# Patient Record
Sex: FEMALE | Race: WHITE | NOT HISPANIC OR LATINO
[De-identification: names, ages, dates, MRNs, and addresses within clinical notes are randomized per-mention and may not be internally consistent; named-entity substitution may affect disease eponyms.]

---

## 2021-01-15 ENCOUNTER — APPOINTMENT (OUTPATIENT)
Dept: ENDOCRINOLOGY | Facility: CLINIC | Age: 36
End: 2021-01-15

## 2024-08-02 ENCOUNTER — NON-APPOINTMENT (OUTPATIENT)
Age: 39
End: 2024-08-02

## 2024-08-05 ENCOUNTER — NON-APPOINTMENT (OUTPATIENT)
Age: 39
End: 2024-08-05

## 2024-08-06 ENCOUNTER — APPOINTMENT (OUTPATIENT)
Dept: OTOLARYNGOLOGY | Facility: CLINIC | Age: 39
End: 2024-08-06

## 2024-08-06 PROBLEM — R09.A2 GLOBUS SENSATION: Status: ACTIVE | Noted: 2024-08-06

## 2024-08-06 PROCEDURE — 99205 OFFICE O/P NEW HI 60 MIN: CPT | Mod: 25

## 2024-08-06 PROCEDURE — 31575 DIAGNOSTIC LARYNGOSCOPY: CPT

## 2024-08-06 NOTE — PHYSICAL EXAM
[TextEntry] : Focused Head and Neck Examination:  General Appearance: The patient has a normal appearance (head and face), able to communicate with normal speech and is a well-groomed, well-developed, well-nourished, thin female in no apparent distress.  Breathing was silent and not labored. The patient was alert had normal affect and oriented to person, place, and time. The patient had normal facial and neck skin with normal facial symmetry, strength and motion, no visible/ palpable facial masses or sinus tenderness.  Otologic Exam: The pinnae and bilateral external ear canals were without lesions and clear.  The tympanic membranes were normal bilaterally without perforation and demonstrated normal mobility. There was no evidence of middle ear effusion or infection. Hearing is grossly normal to finger rub.   External Nasal Exam: The external nose was normal in appearance without lesions or deformity.    Intranasal Exam: There were no mucosal lesions, discolorations, nasal polyps, or masses. The nasal septum was intact without perforations. The nasal septum was deviated slightly to the right. The inferior turbinates were normal. The middle turbinates were normal bilaterally.  The middle meatus was clear, and the secretions were normal.  Nasopharynx: There were no visible masses, mucosal ulcerations, or other lesions.  Secretions were normal.   Oral cavity: The patient's lips and vermillion border were normal without lesions, ulcerations or discolorations. Dentition was normal but with evidence of bruxism, the gums were normal, the oral mucosa was normal and there were no lesions, discolorations, ulcerations, erythema, or leukoplakia.  The floor of mouth was without lesions or palpable calculi. The oral tongue has normal mobility, without palpable or visible lesions, ulcerations, nodularity or tenderness.  All surfaces including lateral, ventral and dorsal mucosa examined and palpated. Expressed salivary flow was normal.  Stensens ducts are without palpable calculi.    Oropharynx: The tongue base was without palpable lesions, the soft palate was normal without lesions, the hard palate was normal without lesions, ulcerations, nodularity or discolorations.  The palatine tonsils were present and normal, and the lingual tonsils were minimally hyperplastic.  Pharynx: The lateral and posterior pharyngeal walls were intact without mucosal lesions, discolorations, ulcerations, or masses.    Larynx and Hypopharynx: Flexible fiber optic laryngoscopy was performed with topical anesthesia using 4% lidocaine and 0.5 % Oxymetazoline on cotton pledgets. More details of the exam are outlined in my procedure note but this examination revealed normal, symmetric vocal fold mobility and normal mucosa without vocal fold lesions, discolorations, or ulcerations.  There was mild posterior pachydermia suggestive of reflux laryngitis.  The epiglottis and false vocal folds were normal without mucosal lesions. The piriform sinuses opened well and there were no lesions or pooling of saliva. The trachea was clear without narrowing in the immediate subglottic area and in midline position without lesions.    Neck Exam: There was no palpable lymphadenopathy or bruits in the central or lateral cecy drainage basins levels I-VI.  There was no asymmetry, pulsatile masses or nodules. The parotid and submandibular glands were not enlarged or tender and without palpable nodules or mass.  The temporomandibular joints were normal bilaterally without deviation/subluxation/click/tenderness or crepitus to palpation.    Thyroid Examination: The thyroidectomy surgical bed is without palpable nodules or mass.  The incision is healed well.  Neurological Exam: Cranial nerves II through XII were intact.  There was no visible tremor. Gross motor and sensory functions are normal.   Ocular Exam: Pupils were equal and responsive to light.  Extraocular movements and gaze were normal. The sclera and conjunctiva were normal and anicteric.  Nystagmus was absent. No sign of erythema, ulcerations or lesions.    Respiratory: Respiratory effort is normal with symmetric chest expansion and no retractions or use of accessory muscles.    Cardiovascular: Extremities are normal with strong pulses, normal temperature, and no edema.

## 2024-08-06 NOTE — CONSULT LETTER
[Dear  ___] : Dear  [unfilled], [Consult Letter:] : I had the pleasure of evaluating your patient, [unfilled]. [Please see my note below.] : Please see my note below. [Sincerely,] : Sincerely, [DrClaudette  ___] : Dr. DUNLAP [FreeTextEntry3] :  Eleuterio Lebron M.D., FACS, [facility] Director [facility] Certified in Thyroid/Parathyroid/Neck Ultrasound, NABILA/ ADELA , Department of Otolaryngology [facility] [employer] [facility] at [facility]

## 2024-08-06 NOTE — HISTORY OF PRESENT ILLNESS
[de-identified] : Jada is a generally healthy 38-year-old female  systems who came for f/u after total thyroidectomy and difficulty managing thyroid hormone replacement associated with infertility x 6 years.  She had a total thyroidectomy in 2015 for a suspicion of malignancy on genetic molecular testing.  However, her final surgical pathology was consistent with Hashimoto's thyroiditis.   She had known Hashimoto's thyroiditis and had already been on thyroid hormone replacement prior to her surgery.  She had been on a relatively stable dose of levothyroxine post op but then voluntarily lost 25 pounds for health reasons and became hyperthyroid.  Her last set of thyroid function studies early July of this year demonstrated a TSH of 0.08, a free T3 of 1.29 (lower limit of normal at 2.18), a free T4 of 0.94 (lower range of normal).  She is currently taking Synthroid brand 150 mcg on Monday, Wednesday, Friday, and Sunday and on Tuesday Thursday, Saturday, 175 mcg.  Therefore, her effective dose of LT4 is ~ 260 mcgs daily.  She complains of chronic throat tightening and increased throat clearing, PND, with clear mucus and denies GERD/pyrosis/regurgitation. She is considering another trial of IVF but wants to have her thyroid hormone better managed.  She actually has an appointment with Taylor Rao at the end of the month. She denies fever, body aches, cough, cyanosis, chest burning or anosmia.  She had 2 COVID infections this year.  All family members at home are well.  She is vaccinated and boosted.

## 2024-08-06 NOTE — PROCEDURE
[Unable to Cooperate with Mirror] : patient unable to cooperate with mirror [Gag Reflex] : gag reflex preventing mirror examination [Globus] : globus [Topical Lidocaine] : topical lidocaine [Oxymetazoline HCl] : oxymetazoline HCl [Flexible Endoscope] : examined with the flexible endoscope [Serial Number: ___] : Serial Number: [unfilled] [de-identified] : Verbal informed consent was obtained from the patient.  Findings: The nasal septum is minimally deviated to the right. There are no masses or polyps, and the nasal mucosa and secretions are normal. The choanae and posterior nasopharynx are normal without masses or drainage. The Eustachian tube orifices appear patent. The pharynx, including the posterior and lateral pharyngeal walls, the vallecula and base of tongue are normal without ulcerations, lesions or masses. The hypopharynx including the pyriform sinuses open well without pooling of secretions, mucosal lesions or masses. The supraglottic larynx including the epiglottis, petiole, arytenoids, glossoepiglottic, aryepiglottic and pharyngoepiglottic folds are normal without mucosal lesions, ulcerations or masses. The glottis reveals normal false vocal folds. The true vocal folds are glistening white, tense and of equal length, without paralysis, having symmetric mobility on adduction and abduction. There are no mucosal lesions, nodules, cysts, erythroplasia or leukoplakia. The posterior cricoid area has healthy pink mucosa in the interarytenoid area and esophageal inlet. There is mild thickening/pachydermia of the interarytenoid mucosa suggestive of posterior laryngitis from laryngopharyngeal acid reflux disease. The trachea is clear without narrowing in the immediate subglottic region, without deviation or lesions. ------------------------------------------------------------------------------------------- [de-identified] : status post total thyroidectomy, throat tightness and throat clearing, postnasal drip

## 2024-08-21 ENCOUNTER — APPOINTMENT (OUTPATIENT)
Dept: ENDOCRINOLOGY | Facility: CLINIC | Age: 39
End: 2024-08-21
Payer: COMMERCIAL

## 2024-08-21 VITALS
TEMPERATURE: 98.5 F | DIASTOLIC BLOOD PRESSURE: 59 MMHG | HEART RATE: 67 BPM | BODY MASS INDEX: 22.36 KG/M2 | SYSTOLIC BLOOD PRESSURE: 93 MMHG | WEIGHT: 151 LBS | HEIGHT: 69 IN | OXYGEN SATURATION: 98 %

## 2024-08-21 DIAGNOSIS — N97.9 FEMALE INFERTILITY, UNSPECIFIED: ICD-10-CM

## 2024-08-21 DIAGNOSIS — E04.9 HYPOTHYROIDISM, UNSPECIFIED: ICD-10-CM

## 2024-08-21 DIAGNOSIS — E03.9 HYPOTHYROIDISM, UNSPECIFIED: ICD-10-CM

## 2024-08-21 PROCEDURE — 99204 OFFICE O/P NEW MOD 45 MIN: CPT

## 2024-08-21 NOTE — PHYSICAL EXAM
[Alert] : alert [Well Nourished] : well nourished [No Acute Distress] : no acute distress [Normal Sclera/Conjunctiva] : normal sclera/conjunctiva [PERRL] : pupils equal, round and reactive to light [Normal Outer Ear/Nose] : the ears and nose were normal in appearance [Normal TMs] : both tympanic membranes were normal [Supple] : the neck was supple [Well Healed Scar] : well healed scar [No Respiratory Distress] : no respiratory distress [Clear to Auscultation] : lungs were clear to auscultation bilaterally [Normal S1, S2] : normal S1 and S2 [Normal Rate] : heart rate was normal [Regular Rhythm] : with a regular rhythm [Normal Bowel Sounds] : normal bowel sounds [Soft] : abdomen soft [Normal Gait] : normal gait [No Joint Swelling] : no joint swelling seen [No Rash] : no rash [Oriented x3] : oriented to person, place, and time [Normal Insight/Judgement] : insight and judgment were intact

## 2024-08-21 NOTE — ASSESSMENT
[FreeTextEntry1] : 38 year old female here for evaluation of Hashimoto's thyroiditis now s/p total thyroidectomy.   Hashimoto's disease:  -Check TFTs -Continue Levothyroxine 150 mcg daily  -Clinically euthyroid   Fertility Treatment:  -Undergoing fertility treatment  -TSH goal of <2.5   Follow up in 6 months

## 2024-08-21 NOTE — REVIEW OF SYSTEMS
[Fatigue] : no fatigue [Decreased Appetite] : appetite not decreased [Visual Field Defect] : no visual field defect [Dysphagia] : no dysphagia [Neck Pain] : no neck pain [Dysphonia] : no dysphonia [Nasal Congestion] : no nasal congestion [Chest Pain] : no chest pain [Palpitations] : no palpitations [Shortness Of Breath] : no shortness of breath [Nausea] : no nausea [Vomiting] : no vomiting [Polyuria] : no polyuria [Irregular Menses] : regular menses [Joint Pain] : no joint pain [Acanthosis] : no acanthosis  [Headaches] : no headaches [Tremors] : no tremors [Depression] : no depression [Polydipsia] : no polydipsia [Cold Intolerance] : no cold intolerance [Easy Bleeding] : no ~M tendency for easy bleeding

## 2024-08-21 NOTE — HISTORY OF PRESENT ILLNESS
[FreeTextEntry1] : 38-year-old female here for evaluation of Hashimoto's thyroiditis now s/p total thyroidectomy (Benign) in 2015   Diagnosed with a nodule at age 23, FNA performed many years later and result was AUS. Resulted benign on surgical path.   Patient reported that she has been on Synthroid 175 mcg daily, and lost 25 lbs and on Synthroid 150 mcg daily. ( 03/2024)  She has had 4 IUI and 3 rounds of IVF   Symptoms:  -No HP or tremors  -No hair loss now -Periods are regular  -Lost 25 lbs intentionally  -Normal bowel movements  -Normal energy levels

## 2024-08-22 LAB
ANION GAP SERPL CALC-SCNC: 11 MMOL/L
BUN SERPL-MCNC: 26 MG/DL
CALCIUM SERPL-MCNC: 9.4 MG/DL
CHLORIDE SERPL-SCNC: 102 MMOL/L
CO2 SERPL-SCNC: 25 MMOL/L
CREAT SERPL-MCNC: 1.04 MG/DL
EGFR: 71 ML/MIN/1.73M2
GLUCOSE SERPL-MCNC: 73 MG/DL
POTASSIUM SERPL-SCNC: 4.6 MMOL/L
SODIUM SERPL-SCNC: 138 MMOL/L
T4 FREE SERPL-MCNC: 1.3 NG/DL
TSH SERPL-ACNC: 0.14 UIU/ML

## 2024-08-22 RX ORDER — LEVOTHYROXINE SODIUM 137 UG/1
137 TABLET ORAL DAILY
Qty: 1 | Refills: 2 | Status: ACTIVE | COMMUNITY
Start: 2024-08-22 | End: 1900-01-01

## 2024-09-27 DIAGNOSIS — E03.9 HYPOTHYROIDISM, UNSPECIFIED: ICD-10-CM

## 2024-09-27 DIAGNOSIS — E04.9 HYPOTHYROIDISM, UNSPECIFIED: ICD-10-CM

## 2025-02-28 ENCOUNTER — RX RENEWAL (OUTPATIENT)
Age: 40
End: 2025-02-28

## 2025-05-26 ENCOUNTER — RX RENEWAL (OUTPATIENT)
Age: 40
End: 2025-05-26

## 2025-06-16 ENCOUNTER — NON-APPOINTMENT (OUTPATIENT)
Age: 40
End: 2025-06-16

## 2025-09-16 ENCOUNTER — APPOINTMENT (OUTPATIENT)
Dept: ENDOCRINOLOGY | Facility: CLINIC | Age: 40
End: 2025-09-16

## 2025-09-18 ENCOUNTER — APPOINTMENT (OUTPATIENT)
Dept: ENDOCRINOLOGY | Facility: CLINIC | Age: 40
End: 2025-09-18

## 2025-09-18 ENCOUNTER — NON-APPOINTMENT (OUTPATIENT)
Age: 40
End: 2025-09-18

## 2025-09-18 VITALS
SYSTOLIC BLOOD PRESSURE: 104 MMHG | BODY MASS INDEX: 24.73 KG/M2 | HEART RATE: 75 BPM | HEIGHT: 69 IN | WEIGHT: 167 LBS | DIASTOLIC BLOOD PRESSURE: 69 MMHG

## 2025-09-18 DIAGNOSIS — E89.0 POSTPROCEDURAL HYPOTHYROIDISM: ICD-10-CM

## 2025-09-19 LAB
T3 SERPL-MCNC: 31 NG/DL
T3FREE SERPL-MCNC: 1.35 PG/ML
T4 FREE SERPL-MCNC: 0.9 NG/DL
TSH SERPL-ACNC: 5.94 UIU/ML